# Patient Record
Sex: MALE | Race: OTHER
[De-identification: names, ages, dates, MRNs, and addresses within clinical notes are randomized per-mention and may not be internally consistent; named-entity substitution may affect disease eponyms.]

---

## 2018-09-28 PROBLEM — Z00.00 ENCOUNTER FOR PREVENTIVE HEALTH EXAMINATION: Status: ACTIVE | Noted: 2018-09-28

## 2018-10-01 ENCOUNTER — TRANSCRIPTION ENCOUNTER (OUTPATIENT)
Age: 38
End: 2018-10-01

## 2018-10-01 ENCOUNTER — APPOINTMENT (OUTPATIENT)
Dept: ORTHOPEDIC SURGERY | Facility: CLINIC | Age: 38
End: 2018-10-01
Payer: COMMERCIAL

## 2018-10-01 VITALS
WEIGHT: 186 LBS | SYSTOLIC BLOOD PRESSURE: 121 MMHG | BODY MASS INDEX: 26.04 KG/M2 | HEART RATE: 85 BPM | DIASTOLIC BLOOD PRESSURE: 79 MMHG | HEIGHT: 71 IN

## 2018-10-01 DIAGNOSIS — Z82.61 FAMILY HISTORY OF ARTHRITIS: ICD-10-CM

## 2018-10-01 DIAGNOSIS — Z82.62 FAMILY HISTORY OF OSTEOPOROSIS: ICD-10-CM

## 2018-10-01 PROCEDURE — 73590 X-RAY EXAM OF LOWER LEG: CPT | Mod: RT

## 2018-10-01 PROCEDURE — 99204 OFFICE O/P NEW MOD 45 MIN: CPT

## 2018-10-01 NOTE — HISTORY OF PRESENT ILLNESS
[de-identified] : Mr Duron is a 37 yo Gentleman who comes in complaining of chronic pain in his lower legs much worse on the left than right but occurs somewhat bilaterally.\par Pain started 10 yrs ago with running.\par 3 yrs ago he saw a podiatrist who thought it was coming from his back.\par He saw a neurologist who said there was no significant pressure on the nerves.\par He saw an ortho last year. \par MRI LS spine which did not show any significant nerve pressure. physical therapy was recommended that he never did PT.\par When he walks indoors or outdoors he needs to walk in shoes with wedge or heels.\par HE otherwise gets back aches and he gets tired easily.\par If he walks a lot he gets tired very easily. \par If he runs he gets intermittent numbness associated with the pain in the leg anterior foot and up the leg. he can walk fast on the treadmill without numbness.\par He started taking Vit D which helped a little.\par He gets some of these symptoms on the right but more mild.\par He never takes anything for pain. rest helps the pain  go away.\par No x-rays or MRIs of his legs.

## 2018-10-01 NOTE — ASSESSMENT
[FreeTextEntry1] : 38-year-old who has had intermittent left greater than right lower leg pain over the last 10 years. It started and was associated with running. The description of his symptoms seems most consistent with exertional compartment syndrome and exam today also seemed consistent although compartment pressure testing is really needed to make the diagnosis. Before doing the compartment pressure testing I would get an MRI to rule out any abnormality in the muscles or compartments of the leg as well as to rule out stress fracture or tumors or other reasons for her leg pain. We will get the MRI of his left leg which is really the most symptomatic.\par He's had some low back pain as well and symptoms that could be consistent with neurogenic claudication. His MRI really wasn't consistent with any significant nerve impingement.\par I would recommend doing some physical therapy for the back pain and he can also do stretching and some myofascial massage of the legs to see if this helps. Afebrile her to get back to running it would be helpful to avoid heel striking him to run more towards the forefoot.\par He is taking vitamin D which is probably good and seemed to help a little bit of his symptoms. He can take 1000 2000 units of vitamin D 3 per day.\par If he gets the symptoms of pain and numbness it should go away with rest.\par I will call him with the MRI results and if that is negative then we will schedule the compartment pressure testing.\par His compartment pressure testing is positive then we can consider surgical treatment which would be release of the compartments that are affected, most likely anterior and lateral. I explained this procedure but we will discuss further if it comes to that.\par Given his symptoms with fast walking and chronic symptoms, if he does have exertional compartment syndrome, I think it's less likely to be amenable to nonoperative treatment.

## 2018-10-01 NOTE — PHYSICAL EXAM
[LE] : Sensory: Intact in bilateral lower extremities [DP] : dorsalis pedis 2+ and symmetric bilaterally [PT] : posterior tibial 2+ and symmetric bilaterally [Normal RLE] : Right Lower Extremity: No scars, rashes, lesions, ulcers, skin intact [Normal LLE] : Left Lower Extremity: No scars, rashes, lesions, ulcers, skin intact [Normal Touch] : sensation intact for touch [Normal] : Oriented to person, place, and time, insight and judgement were intact and the affect was normal [de-identified] : Bilateral lower legs:\par He walks with a normal gait and can walk on his heels and toes without difficulty.\par I had him do a repetitive standing ankle dorsiflexion In his feet bilaterally and after about 45 seconds it started to reproduce the pain in the anterior lower leg left greater than right and he felt numbness and slight tingling on the left side dorsally into his foot.\par Compartments to feel slightly on the tight side. No evidence of any acute compartment syndrome. No pain with passive stretch of the hallux.\par Minimally tender through the anterior compartment. Nontender posterior gastrocnemius and posterior compartments which feels softer than the anterior and lateral compartments. Negative Tinel's over the superficial peroneal nerve.\par Negative straight leg raise.\par Intact ankle and subtalar and MP joint motion. Intact knee motion without limitation.\par Unremarkable knee and ankle exam.\par Feet are warm and normal capillary refill. [de-identified] : no respiratory distress [de-identified] : X-rays bilateral lower legs AP and lateral are unremarkable tibia and fibula\par \par MRI Lumbosacral spine from September 2017 which shows disc bulges L3-4, L4-5 and L5-S1 levels without any nerve impingement.

## 2018-10-11 ENCOUNTER — APPOINTMENT (OUTPATIENT)
Dept: ORTHOPEDIC SURGERY | Facility: CLINIC | Age: 38
End: 2018-10-11

## 2018-11-07 PROBLEM — M79.661 PAIN IN BOTH LOWER LEGS: Status: ACTIVE | Noted: 2018-10-01

## 2018-11-08 ENCOUNTER — APPOINTMENT (OUTPATIENT)
Dept: ORTHOPEDIC SURGERY | Facility: CLINIC | Age: 38
End: 2018-11-08
Payer: COMMERCIAL

## 2018-11-08 DIAGNOSIS — M79.662 PAIN IN RIGHT LOWER LEG: ICD-10-CM

## 2018-11-08 DIAGNOSIS — M79.661 PAIN IN RIGHT LOWER LEG: ICD-10-CM

## 2018-11-08 PROCEDURE — 99214 OFFICE O/P EST MOD 30 MIN: CPT | Mod: 25

## 2018-11-08 PROCEDURE — 20950 MNTR INTRSTITIAL FLUID PRESS: CPT | Mod: 59,RT

## 2018-11-08 NOTE — PROCEDURE
[de-identified] : \par Compartment pressure testing bilateral lower legs:\par \par He ran on the treadmill to reproduce symptoms of pain in his legs. He ran for about 10 minutes with incline and a moderate to fast-paced and the symptoms appeared in his LEFT leg first and were minimal to none in the RIGHT leg. He only felt pain anterior to lateral and not posterior at all or medial.\par On exam when he laid down the anterior and lateral compartments felt firm/tight on the LEFT and mildly tight on the RIGHT not as much. Posterior compartments felt soft and nontender. No numbness. \par \par Legs were cleaned with alcohol and compartment pressure testing was done with a [employer] monitor.\par Pressures:\par RIGHT\par Anterior 58 mmHg\par Lateral 53 mmHg\par \par LEFT\par Anterior 25 mmHg\par Lateral 23 mmHg\par \par Posterior compartments were not tested since they were very soft and asymptomatic and therefore clinically not consistent with exertional compartment syndrome\par \par Band-Aids were applied. He tolerated the procedure well.

## 2018-11-08 NOTE — PHYSICAL EXAM
[LE] : Sensory: Intact in bilateral lower extremities [DP] : dorsalis pedis 2+ and symmetric bilaterally [PT] : posterior tibial 2+ and symmetric bilaterally [Normal RLE] : Right Lower Extremity: No scars, rashes, lesions, ulcers, skin intact [Normal LLE] : Left Lower Extremity: No scars, rashes, lesions, ulcers, skin intact [Normal Touch] : sensation intact for touch [Normal] : Oriented to person, place, and time, insight and judgement were intact and the affect was normal [de-identified] : Bilateral lower legs\par Before the pressure testing his exam was unremarkable.\par There is good lumbar range of motion with negative straight leg raise. Motor and sensation are intact throughout bilateral lower extremities without numbness or weakness.\par He can walk on his heels and toes without difficulty and no pain with ambulation.

## 2018-11-08 NOTE — HISTORY OF PRESENT ILLNESS
[de-identified] : Viraj comes in for f/u for his left>right  leg pain. He was last seen about 5 wks ago.\par Since then He continues to feel occasional discomfort in his lower legs when walking and fatigue. He started running a few weeks ago again because he wanted to make sure he was having the symptoms for the testing today and he's been feeling it in the LEFT leg anterolaterally but not in the RIGHT. Occasionally he seems to have some pain in the shin.\par He had the left lower leg MRI that showed a small amount of fluid in the medial soleus and plantaris. No stress fracture He does Not get pain in the posterior calf area.When the symptoms are bad he'll start to get numbness on top of his foot.\par He also has some chronic low back pain and had an MRI in 2017 showing disc bulges and loss of lordosis.He gets pain sitting a long time in the low back.\par He feels better in terms of his legs and back when he walks wearing shoes with a slight lift her heel/sneaker and not flat shoes/loafers.\par \par He

## 2018-11-08 NOTE — ASSESSMENT
[FreeTextEntry1] : 38-year-old with pain in both legs that clinically seem consistent with an exertional compartment syndrome.\par Compartment pressure testing done today was consistent with exertional compartment syndrome in the anterior and lateral compartments of the LEFT leg only. The RIGHT anterior and lateral compartment pressures were not elevated. Posterior pressures were not tested since clinically the compartments were very soft and asymptomatic.\par He also has the chronic low back pain.\par Have referred him to physical therapy which he had not done after the last visit. He should work on his back and lower legs.\par He should do stretching and a lot of back and core strengthening exercises. He should lay off the running for now.\par When he starts to get back to running and then he should perhaps try to modify his running gait staying more forward on his feet and not activate the anterior and lateral compartments as much and see if this is helpful. If he has ongoing symptoms then compartment fasciotomy would be the surgical procedure to treat exertional compartment syndrome and I would recommend a LEFT anterior and lateral compartments to be released in the LEFT leg.\par I discussed the procedure and risks and benefits were discussed further obviously if we actually proceed with surgery. He would not want to do surgery as of yet.\par He also seems to have had some shin pain and perhaps he has some posterior medial tibial stress syndrome as well. There was not fluid in the distal posterior medial shin on the MRI that was done but there was a little bit of fluid in the soleus medially. Based on his symptoms today I would not recommend releasing the posterior compartments medially but if he had anterior and lateral fasciotomy and then had ongoing symptoms this might be something to look into further.\par He will do the therapy for the next 6-8 weeks in followup in about 2 months to check and see how he is feeling.

## 2019-06-11 PROBLEM — M54.5 MECHANICAL LOW BACK PAIN: Status: ACTIVE | Noted: 2018-10-01

## 2019-06-12 ENCOUNTER — APPOINTMENT (OUTPATIENT)
Dept: ORTHOPEDIC SURGERY | Facility: CLINIC | Age: 39
End: 2019-06-12
Payer: COMMERCIAL

## 2019-06-12 DIAGNOSIS — M54.5 LOW BACK PAIN: ICD-10-CM

## 2019-06-12 DIAGNOSIS — M79.671 PAIN IN RIGHT FOOT: ICD-10-CM

## 2019-06-12 DIAGNOSIS — M79.672 PAIN IN LEFT FOOT: ICD-10-CM

## 2019-06-12 DIAGNOSIS — M79.A22 NONTRAUMATIC COMPARTMENT SYNDROME OF LEFT LOWER EXTREMITY: ICD-10-CM

## 2019-06-12 PROCEDURE — 73630 X-RAY EXAM OF FOOT: CPT | Mod: 50

## 2019-06-12 PROCEDURE — 99214 OFFICE O/P EST MOD 30 MIN: CPT

## 2019-06-12 NOTE — ASSESSMENT
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 I'm not sure if it's related to the exertional compartment syndrome which is doing much better now that he is not running. He doesn't seem to get the leg pain anymore and the foot pain is a separate issue. The pain is on the bottom and not in the distribution of the superficial peroneal nerve.\par He has good pulses but perhaps he has some vascular compromise such as popliteal artery entrapment syndrome. I referred him to vascular.\par I referred him to physical therapy for them to work on massage, stretching and strengthening and see if this helps. He should continue to wear shoes with good cushioning and support. He may want to try off-the-shelf orthotics. Warm soaks and ice.\par Followup in 7-8 weeks

## 2019-06-12 NOTE — PHYSICAL EXAM
[PT] : posterior tibial 2+ and symmetric bilaterally [LE] : Sensory: Intact in bilateral lower extremities [DP] : dorsalis pedis 2+ and symmetric bilaterally [Normal RLE] : Right Lower Extremity: No scars, rashes, lesions, ulcers, skin intact [Normal LLE] : Left Lower Extremity: No scars, rashes, lesions, ulcers, skin intact [Normal Touch] : sensation intact for touch [Normal] : Alert and in no acute distress [de-identified] : Bilateral lower legs, feet and ankles\par hyperpronation\par  Feet.\par He can walk on his heels and toes without any difficulty and good arch dynamics.\par Normal gait/nonantalgic.\par Lumbar range of motion is without pain or limitation.\par Negative straight leg raise.\par 2+ dorsalis pedis and posterior tibial pulses. Feet are warm and normal capillary refill.\par Sensation is intact in the feet and lower extremities.\par 5/5 anterior tibial tendon, gastrocsoleus, peroneals and posterior tibial tendon and EHL bilaterally.\par Compartments are soft.\par No edema, ecchymoses, erythema.\par Intact ankle and subtalar and MP joint range of motion without any pain or limitation\par There is no significant point tenderness in his feet. There is some mild generalized tenderness through the plantar foot [de-identified] : \par X-rays bilateral feet weight-bearing 3 views today show no arthritis or bony lesions or any significant abnormalities.

## 2019-06-12 NOTE — HISTORY OF PRESENT ILLNESS
[de-identified] : Viraj comes in for pain in his feet. He was seen last fall and he had compartment pressure testing which showed elevated pressures in the RIGHT leg anterior and lateral compartments but not the LEFT.\par he changed his exercise pattern. He swims more, runs less, and doesn't feel the leg pain like he had.\par Now he comes in complaining of pain in the bottoms of both feet diffusely that occurs with any weightbearing.\par He cannot stand or walk with bare feet because within a few minutes he feels soreness in the heels and generally in the feet. He feels fatigued quickly and feet are sore.No swelling. No leg pain like he had with the exertional compartment syndrome. \par It is progressing and worsening. He states when he just shaves which may take 5 minutes he will get a lot of foot pain standing for those 5 minutes barefoot.\par He walks at home with slippers. He never went to PT. He never takes medicine. With rest and elevation and NWB pain resolves. \par He saw a Neurologist a few years ago and he has had an MRI of the lumbar spine but didn't show any stenosis or nerve impingement.\par No discoloration of the feet. He knows he has flatfeet and years ago had orthotics made by a podiatrist when he was having the leg pain but wasn't having foot pain at that time.\par He does wear shoes with more cushion

## 2019-08-23 ENCOUNTER — APPOINTMENT (OUTPATIENT)
Dept: VASCULAR SURGERY | Facility: CLINIC | Age: 39
End: 2019-08-23
Payer: COMMERCIAL

## 2019-08-23 VITALS
SYSTOLIC BLOOD PRESSURE: 109 MMHG | DIASTOLIC BLOOD PRESSURE: 66 MMHG | WEIGHT: 189 LBS | HEART RATE: 90 BPM | OXYGEN SATURATION: 98 % | HEIGHT: 71 IN | BODY MASS INDEX: 26.46 KG/M2

## 2019-08-23 PROCEDURE — 99244 OFF/OP CNSLTJ NEW/EST MOD 40: CPT

## 2019-08-23 NOTE — HISTORY OF PRESENT ILLNESS
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

## 2019-08-23 NOTE — ASSESSMENT
[FreeTextEntry1] : vascular exam is normal \par reassured the patient that the numbness is from his back\par the pain in the legs is mild compartment syndrome\par \par

## 2019-08-23 NOTE — PHYSICAL EXAM
[JVD] : no jugular venous distention  [2+] : left 2+ [No Rash or Lesion] : No rash or lesion [Calm] : calm [de-identified] : pleasant

## 2021-03-05 ENCOUNTER — APPOINTMENT (OUTPATIENT)
Dept: ORTHOPEDIC SURGERY | Facility: CLINIC | Age: 41
End: 2021-03-05
Payer: COMMERCIAL

## 2021-03-05 ENCOUNTER — TRANSCRIPTION ENCOUNTER (OUTPATIENT)
Age: 41
End: 2021-03-05

## 2021-03-05 DIAGNOSIS — M51.26 OTHER INTERVERTEBRAL DISC DISPLACEMENT, LUMBAR REGION: ICD-10-CM

## 2021-03-05 DIAGNOSIS — M76.891 OTHER SPECIFIED ENTHESOPATHIES OF RIGHT LOWER LIMB, EXCLUDING FOOT: ICD-10-CM

## 2021-03-05 DIAGNOSIS — M75.82 OTHER SHOULDER LESIONS, LEFT SHOULDER: ICD-10-CM

## 2021-03-05 DIAGNOSIS — M79.A21 NONTRAUMATIC COMPARTMENT SYNDROME OF RIGHT LOWER EXTREMITY: ICD-10-CM

## 2021-03-05 PROCEDURE — 99072 ADDL SUPL MATRL&STAF TM PHE: CPT

## 2021-03-05 PROCEDURE — 99214 OFFICE O/P EST MOD 30 MIN: CPT

## 2021-03-05 NOTE — ASSESSMENT
[FreeTextEntry1] : 40-year-old with 2-3 months of intermittent RIGHT posterior thigh pain that occurs with sitting.This is consistent with either a hamstring tendinopathy versus sciatica from the lumbar spine. He is not having back pain and on exam I cannot elicit pain with straight leg raise.\par He does have a history of disc bulges and a disc herniation.\par Patient's can have radicular pain without back pain so it still possible this is coming from his spine.\par I recommended taking a course of an anti-inflammatory which he has not yet tried He can take diclofenac 50 mg twice a day for a week and then once a day for another week or 2 and then as needed. He should try to avoid sitting so long to the point that it aggravates the pain.\par Heat and ice.\par He has some mild LEFT shoulder pain with certain movements consistent with impingement/rotator cuff tendinopathy. This started without any injury either. It doesn't seem at all connected to his thigh pain even though they started around the same time.\par i recommended physical therapy and anti-inflammatories for this as well. Follow up in 6-8 weeks if he is not better or as needed.

## 2021-03-05 NOTE — PHYSICAL EXAM
[Normal RLE] : Right Lower Extremity: No scars, rashes, lesions, ulcers, skin intact [Normal LLE] : Left Lower Extremity: No scars, rashes, lesions, ulcers, skin intact [Normal Touch] : sensation intact for touch [Normal] : Oriented to person, place, and time, insight and judgement were intact and the affect was normal [UE/LE] : Sensory: Intact in bilateral upper & lower extremities [Normal RUE] : Right Upper Extremity: No scars, rashes, lesions, ulcers, skin intact [Normal LUE] : Left Upper Extremity: No scars, rashes, lesions, ulcers, skin intact [de-identified] : LB and LEs\par He walks with a normal gait and can walk on his heels and toes without any difficulty.\par There is asymmetry in the waist. 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 Mild focal tenderness in the RIGHT made posterior hamstrings. No deformity or palpable defects in the muscle. No masses.\par \par LEFT  shoulder:\par Cervical spine is without tenderness and ROM is within normal limits and without pain.\par Normal appearance. \par AROM: 180 FE, IR to T 9, 180 abd.\par PROM: 180 FE, 70 ER at the side, 90 ER and 60 IR in the 90 degree abducted position.\par Motor:  5/5  supraspinatus,  5/5 ER, 5/5 IR, 5/5 biceps, 5/5 deltoid.  Normal lift off test\par - Neer, - [place]. -  X-arm.   - [place], - Speeds.\par Tender over the biceps tendon and the anterior shoulder.  \par No scapular winging.\par Sensation is intact distally in the UE.\par Skin is intact in the UE. \par Intact Motor distally.\par  [de-identified] : No respiratory distress or cough [de-identified] : \par he's had MRI of the lumbar spine in September 2017 and had some disc bulges and a central disc herniation at L5-S1 and mild curvature in the spine.

## 2021-03-05 NOTE — HISTORY OF PRESENT ILLNESS
[de-identified] : 39 yo comes in today for pain in the right posterior thigh that started 2 mos ago without any injury.\par he has been sitting a lot over the last year working from home and computer. He was having back pain from sitting so much last summer and did physical therapy but the pain was non-radiating down his legs. In the last 2-3 months he noticed pain posterior thigh that occurred with sitting. Sitting in a car and driving would bring on the pain much faster than sitting on a higher seat.Crossing the leg also can cause more pain. There is no back pain and no numbness or tingling and pain did not radiate below the knee.He hasn't had the lower leg pain that I had seen him for a few years ago. That seemed to go away and he could get back to some running. Recently he hasn't been exercising at all or doing anything to cause pain except sitting a lot.\par He hasn't done recent physical therapy.\par \par He also complains of some recent LEFT shoulder pain that occurs with certain movements.Pain tends to be throbbing

## 2021-12-21 ENCOUNTER — APPOINTMENT (OUTPATIENT)
Dept: ORTHOPEDIC SURGERY | Facility: CLINIC | Age: 41
End: 2021-12-21
Payer: COMMERCIAL

## 2021-12-21 VITALS — RESPIRATION RATE: 16 BRPM | WEIGHT: 189 LBS | HEIGHT: 71 IN | BODY MASS INDEX: 26.46 KG/M2

## 2021-12-21 DIAGNOSIS — S69.91XD UNSPECIFIED INJURY OF RIGHT WRIST, HAND AND FINGER(S), SUBSEQUENT ENCOUNTER: ICD-10-CM

## 2021-12-21 PROCEDURE — 99214 OFFICE O/P EST MOD 30 MIN: CPT
